# Patient Record
Sex: FEMALE | Race: WHITE | URBAN - METROPOLITAN AREA
[De-identification: names, ages, dates, MRNs, and addresses within clinical notes are randomized per-mention and may not be internally consistent; named-entity substitution may affect disease eponyms.]

---

## 2017-04-13 ENCOUNTER — TRANSFERRED RECORDS (OUTPATIENT)
Dept: HEALTH INFORMATION MANAGEMENT | Facility: CLINIC | Age: 67
End: 2017-04-13

## 2017-05-09 ENCOUNTER — TRANSFERRED RECORDS (OUTPATIENT)
Dept: HEALTH INFORMATION MANAGEMENT | Facility: CLINIC | Age: 67
End: 2017-05-09

## 2017-05-11 ENCOUNTER — MEDICAL CORRESPONDENCE (OUTPATIENT)
Dept: HEALTH INFORMATION MANAGEMENT | Facility: CLINIC | Age: 67
End: 2017-05-11

## 2017-05-11 ENCOUNTER — TRANSFERRED RECORDS (OUTPATIENT)
Dept: HEALTH INFORMATION MANAGEMENT | Facility: CLINIC | Age: 67
End: 2017-05-11

## 2017-06-19 NOTE — PROGRESS NOTES
Consult Notes on Referred Patient        Dr. Nicanor Palmer MD  [facility]  [address] [place] [address]       RE: Jane Amaya  : 1950  SHARDA: 2017    Dear Dr. Nicanor Palmer:    I had the pleasure of seeing your patient Jane Amaya here at the Gynecologic Cancer Clinic at the [facility] on 2017.  As you know she is a very pleasant 67 year old woman with a recent diagnosis of pelvic mass.  Given these findings she was subsequently sent to the Gynecologic Cancer Clinic for new patient consultation.  She is accompanied today by her daughter and infant grandson.    The patient has a history of lymphoma and was recently in to see her PCP for work up of a UTI and she ordered a CT for follow up for her lymphoma as she has not had follow up in quite some time.  She is relatively asymptomatic from this mass that was discovered    05:  Excision of intra-abdominal mass   Pathology:  Follicular lymphoma    17:  CT C/A/P:  Pulmonary nodule in RUL has increased in size now measuring 1.3 x 1.1 cm.  Stable subpleural nodule in the LLL.  Enlarging complex right ovarian cystic mass with mural nodularity and probable septation measuring 7.9 x 5.1 x 6.8 cm, previously 5.3 x 3.4 x 3.6 cm.  Partially calcified soft tissue lesion in left abdominal mesentery measuring 3.1 x 1.8 cm is stable along with other small lymphadenopathy which is consistent with treatment effect.      Review of Systems:  Systemic           no weight changes; no fever; no chills; no night sweats; no appetite changes; + fatigue  Skin           no rashes, or lesions  Eye           no irritation; no changes in vision  Rodrigue-Laryngeal           no dysphagia; no hoarseness   Pulmonary    no cough; + shortness of breath  Cardiovascular    no chest pain; no palpitations  Gastrointestinal    + diarrhea; + heartburn; no constipation; no abdominal pain; no changes in bowel  habits; no blood in  stool  Genitourinary   no urinary frequency; no urinary urgency; no dysuria; no pain; no abnormal vaginal discharge; no abnormal vaginal bleeding  Breast    no breast discharge; no breast changes; no breast pain  Musculoskeletal    no myalgias; no arthralgias; + back pain; + joint pain; + stiffness; + muscle weakness/cramps  Psychiatric           + depressed mood; no anxiety    Hematologic            no tender lymph nodes; no noticeable swellings or lumps   Endocrine    + hot flashes; no heat/cold intolerance         Neurological   no tremor; + numbness and tingling in hands; no headaches; no difficulty sleeping    Obstetrics and Gynecology History:  ,   Menopause in 40's, denies PMB      Past Medical History:  Past Medical History:   Diagnosis Date     Follicular lymphoma (H)      GERD (gastroesophageal reflux disease)        Past Surgical History:  Past Surgical History:   Procedure Laterality Date     APPENDECTOMY         Health Maintenance:  Last Pap Smear:               Result: normal  She has not had a history of abnormal Pap smears.    Last Mammogram: 6 years              Result: normal      She has not had a history of abnormal mammograms.    Last Colonoscopy: 11 years ago              Result: normal      Current Medications:   has a current medication list which includes the following prescription(s): esomeprazole magnesium.     Allergies:   Ciprofloxacin and fentanyl      Social History:  Social History     Social History     Marital status:      Spouse name: N/A     Number of children: N/A     Years of education: N/A     Occupational History     Not on file.     Social History Main Topics     Smoking status: Former Smoker     Quit date:      Smokeless tobacco: Not on file     Alcohol use No     Drug use: No     Sexual activity: Not on file     Other Topics Concern     Not on file     Social History Narrative     No narrative on file     Lives with  and roommate, feels safe  "at home.  Retired.  Enjoys reading, gardening, crocheting, spending time with family.  Does not have an advanced directive on file and would like her daughter, Geri to be her POA.  Would like full resuscitation if reversible cause is identified, however would not like to be kept on life sustaining measures long-term.      Family History:   The patient's family history is significant for.  Family History   Problem Relation Age of Onset     Breast Cancer Mother          Physical Exam:   /76  Pulse 64  Temp 97.3  F (36.3  C) (Oral)  Resp 20  Ht 1.727 m (5' 8\")  Wt 92.5 kg (204 lb)  SpO2 96%  BMI 31.02 kg/m2  Body mass index is 31.02 kg/(m^2).    General Appearance: healthy and alert, no distress     HEENT:  no thyromegaly, no palpable nodules or masses        Cardiovascular: regular rate and rhythm, no gallops, rubs or murmurs     Respiratory: lungs clear, no rales, rhonchi or wheezes, normal diaphragmatic excursion    Musculoskeletal: extremities non tender and without edema    Skin: no lesions or rashes     Neurological: normal gait, no gross defects     Psychiatric: appropriate mood and affect                               Hematological: normal cervical, supraclavicular and inguinal lymph nodes     Gastrointestinal:       abdomen soft, non-tender, non-distended, no organomegaly or masses    Genitourinary: External genitalia and urethral meatus appears normal.  Vagina is smooth without nodularity or masses.  Cervix appears normal and without lesions.  Bimanual exam reveal no masses, nodularity or fullness.  Recto-vaginal exam confirms these findings.      Assessment:    Jane Amaya is a 67 year old woman with a new diagnosis of pelvic mass.     A total of 60 minutes was spent with the patient, >50% of which were spent in counseling the patient and/or treatment planning.      Plan:     1.)    Pelvic mass.  Etiologies were discussed including benign, borderline and malignant, both a recurrence of her " lymphoma and a new primary ovarian malignancy.  We discussed the rationale for not performing a biopsy of the ovary.  We discussed the role of  and that one would be drawn today.  We discussed that given its size and her history, my recommendation is for removal of the mass.  We discussed bilateral oophorectomy given her age.  We discussed the role of frozen section analysis and that further surgical decisions would be based on these findings.  Risks, benefits, indications and alternatives were discussed with the patient.  All her questions were answered and consents were signed for laparoscopic removal of both ovaries and fallopian tubes, possible removal of uterus, possible cancer surgery, possible open on 6/28.     2.) Genetic risk factors were assessed and the patient does not meet the qualifications for a referral unless malignancy is identified at surgery.      3.) Labs and/or tests ordered include:  CBC, CMP, T/S, , EKG.     4.) Health maintenance issues addressed today include pt is due for mammogram and colonoscopy which will be addressed following surgery.    5.) Pre-op teaching was completed today.        Thank you for allowing us to participate in the care of your patient.         Sincerely,    Ashley Knutson MD  Gynecologic Oncology  [facility]       NAN FOSTER

## 2017-06-20 RX ORDER — HEPARIN SODIUM 10000 [USP'U]/ML
5000 INJECTION, SOLUTION INTRAVENOUS; SUBCUTANEOUS
Status: CANCELLED | OUTPATIENT
Start: 2017-06-20

## 2017-06-20 RX ORDER — PHENAZOPYRIDINE HYDROCHLORIDE 100 MG/1
200 TABLET, FILM COATED ORAL ONCE
Status: CANCELLED | OUTPATIENT
Start: 2017-06-20 | End: 2017-06-20

## 2017-06-23 ENCOUNTER — CARE COORDINATION (OUTPATIENT)
Dept: ONCOLOGY | Facility: CLINIC | Age: 67
End: 2017-06-23

## 2017-06-23 ENCOUNTER — ONCOLOGY VISIT (OUTPATIENT)
Dept: ONCOLOGY | Facility: CLINIC | Age: 67
End: 2017-06-23
Payer: COMMERCIAL

## 2017-06-23 VITALS
SYSTOLIC BLOOD PRESSURE: 140 MMHG | WEIGHT: 204 LBS | HEART RATE: 64 BPM | RESPIRATION RATE: 20 BRPM | DIASTOLIC BLOOD PRESSURE: 76 MMHG | OXYGEN SATURATION: 96 % | TEMPERATURE: 97.3 F | BODY MASS INDEX: 30.92 KG/M2 | HEIGHT: 68 IN

## 2017-06-23 DIAGNOSIS — R19.00 PELVIC MASS: Primary | ICD-10-CM

## 2017-06-23 DIAGNOSIS — D39.11 NEOPLASM OF UNCERTAIN BEHAVIOR OF OVARY, RIGHT: ICD-10-CM

## 2017-06-23 DIAGNOSIS — R19.00 PELVIC MASS: ICD-10-CM

## 2017-06-23 LAB
ALBUMIN SERPL-MCNC: 4.1 G/DL (ref 3.4–5)
ALP SERPL-CCNC: 126 U/L (ref 40–150)
ALT SERPL W P-5'-P-CCNC: 31 U/L (ref 0–50)
ANION GAP SERPL CALCULATED.3IONS-SCNC: 5 MMOL/L (ref 3–14)
AST SERPL W P-5'-P-CCNC: 18 U/L (ref 0–45)
BASOPHILS # BLD AUTO: 0 10E9/L (ref 0–0.2)
BASOPHILS NFR BLD AUTO: 0.3 %
BILIRUB SERPL-MCNC: 0.4 MG/DL (ref 0.2–1.3)
BUN SERPL-MCNC: 13 MG/DL (ref 7–30)
CALCIUM SERPL-MCNC: 8.7 MG/DL (ref 8.5–10.1)
CHLORIDE SERPL-SCNC: 108 MMOL/L (ref 94–109)
CO2 SERPL-SCNC: 30 MMOL/L (ref 20–32)
CREAT SERPL-MCNC: 0.73 MG/DL (ref 0.52–1.04)
DIFFERENTIAL METHOD BLD: NORMAL
EOSINOPHIL # BLD AUTO: 0.1 10E9/L (ref 0–0.7)
EOSINOPHIL NFR BLD AUTO: 1.3 %
ERYTHROCYTE [DISTWIDTH] IN BLOOD BY AUTOMATED COUNT: 12.9 % (ref 10–15)
GFR SERPL CREATININE-BSD FRML MDRD: 79 ML/MIN/1.7M2
GLUCOSE SERPL-MCNC: 101 MG/DL (ref 70–99)
HCT VFR BLD AUTO: 44.3 % (ref 35–47)
HGB BLD-MCNC: 15.2 G/DL (ref 11.7–15.7)
LYMPHOCYTES # BLD AUTO: 1.6 10E9/L (ref 0.8–5.3)
LYMPHOCYTES NFR BLD AUTO: 26.6 %
MCH RBC QN AUTO: 30.6 PG (ref 26.5–33)
MCHC RBC AUTO-ENTMCNC: 34.3 G/DL (ref 31.5–36.5)
MCV RBC AUTO: 89 FL (ref 78–100)
MONOCYTES # BLD AUTO: 0.5 10E9/L (ref 0–1.3)
MONOCYTES NFR BLD AUTO: 7.4 %
NEUTROPHILS # BLD AUTO: 3.9 10E9/L (ref 1.6–8.3)
NEUTROPHILS NFR BLD AUTO: 64.4 %
PLATELET # BLD AUTO: 229 10E9/L (ref 150–450)
POTASSIUM SERPL-SCNC: 4.3 MMOL/L (ref 3.4–5.3)
PROT SERPL-MCNC: 7.1 G/DL (ref 6.8–8.8)
RBC # BLD AUTO: 4.96 10E12/L (ref 3.8–5.2)
SODIUM SERPL-SCNC: 143 MMOL/L (ref 133–144)
WBC # BLD AUTO: 6.1 10E9/L (ref 4–11)

## 2017-06-23 PROCEDURE — 99205 OFFICE O/P NEW HI 60 MIN: CPT | Performed by: OBSTETRICS & GYNECOLOGY

## 2017-06-23 PROCEDURE — 86901 BLOOD TYPING SEROLOGIC RH(D): CPT | Performed by: OBSTETRICS & GYNECOLOGY

## 2017-06-23 PROCEDURE — 86850 RBC ANTIBODY SCREEN: CPT | Performed by: OBSTETRICS & GYNECOLOGY

## 2017-06-23 PROCEDURE — 85025 COMPLETE CBC W/AUTO DIFF WBC: CPT | Performed by: OBSTETRICS & GYNECOLOGY

## 2017-06-23 PROCEDURE — 80053 COMPREHEN METABOLIC PANEL: CPT | Performed by: OBSTETRICS & GYNECOLOGY

## 2017-06-23 PROCEDURE — 36415 COLL VENOUS BLD VENIPUNCTURE: CPT | Performed by: OBSTETRICS & GYNECOLOGY

## 2017-06-23 PROCEDURE — 86900 BLOOD TYPING SEROLOGIC ABO: CPT | Performed by: OBSTETRICS & GYNECOLOGY

## 2017-06-23 ASSESSMENT — PAIN SCALES - GENERAL: PAINLEVEL: NO PAIN (0)

## 2017-06-23 NOTE — PROGRESS NOTES
Patient Education Note - [facility]    Relevant Diagnosis:  Pelvic Mass    Teaching Topic:  Laparoscopic removal of both ovaries and fallopian tubes, possible removal of uterus, possible cancer surgery, possible open    Person(s) involved in teaching:  Patient and Daughter    Motivation Level:    Asks Questions:   Yes  Eager to Learn:  Yes  Cooperative:  Yes  Receptive (willing/able to accept information):  Yes  Comments:  None    Patient demonstrates understanding of the following:  Reason for the appointment, diagnosis and treatment plan:  Yes  Knowledge of proper use of medications and conditions for which they are ordered (with special attention to potential side effects or drug interactions):  Yes  Which situations necessitate calling provider and whom to contact:  Yes    Teaching Concerns:  No    Education/Instructional Materials Used/Given:     Before Your Surgery Booklet (NanoLumens)  Showering Before Surgery, bottle of CHG prep provided  Adult Pain Assessment Tool  Lymphedema: A Patient Resource Guide  Hysterectomy Guidelines   Home Care after Major Abdominal or Vaginal Surgery  [facility]  Accommodations Brochure   Phone numbers for [facility] and [address] Given to Patient    EKG: Completed in clinic    Lab: Completed in clinic    Chest xray: Not ordered    Post-op exam: 7/7/17 at 12:30 pm    Pre-op exam: N/A    Time spent teaching with patient:  25 minutes    Surgical consent forms, along with copy of EKG, faxed to [facility] at fax number [phone].  [facility]gery scheduling staff at [facility] also notified.      Joby Washburn, RN, BSN, OCN  RN Care Coordinator  [facility]

## 2017-06-23 NOTE — MR AVS SNAPSHOT
After Visit Summary   6/23/2017    Jane Amaya    MRN: [id number]           Patient Information     Date Of Birth          1950        Visit Information        Provider Department      6/23/2017 12:30 PM Ashley Emerson MD [facility] [[email or web address]]        Today's Diagnoses     Pelvic mass    -  1    Neoplasm of uncertain behavior of ovary, right           Care Instructions    You have been scheduled for surgery on: 6/28/17    Diagnosis:  Pelvic mass    The surgical procedure is: Laparoscopic removal of both ovaries and fallopian tubes, possible removal of uterus, possible cancer surgery, possible open    Anticipated length of surgery: 1-3 hrs.  You will be in the recovery room for approximately 2-3 hrs after surgery.  Your family will not be able to see you until after you leave the recovery room.    Length of hospital stay:  This is an outpatient, extended stay surgery.  You will be discharged same day if you meet criteria for discharge.  If you have a larger surgical incision, you will need to be in the hospital 2-3 days.  ______________________________________________________________________    Preparation for Surgery:    To Schedule   1.  Labs:  CBC, CMP, T/S, , orders placed, please perform today   2.  EKG:  Order placed, please perform today   3.  Post-operative exam with me 1-2 weeks following surgery      Postoperative Restrictions:  No heavy lifting >20lbs for eight weeks, nothing in the vagina (no tampons, no intercourse, no douching) for eight weeks.     Postoperative visit:  Return to clinic 1-2 weeks after surgery for post operative visit.      Please contact the clinic with any questions or concerns.    Ashley Knutson MD  Gynecologic Oncology  [facility]               Follow-ups after your visit        Your next 10 appointments already scheduled     Jun 28, 2017   Procedure with Ashley Hernandez MD   [facility] [place], Same Day  Surgery (--)    [address]  [place] [address]   [phone]            Jul 07, 2017 12:30 PM CDT   [facility]   [facility]    [address] [place] [address]   [phone]              Future tests that were ordered for you today     Open Future Orders        Priority Expected Expires Ordered    CBC with platelets differential Routine  6/23/2018 6/23/2017    CMP - Comprehensive Metabolic Panel Routine  6/23/2018 6/23/2017     Routine  6/23/2018 6/23/2017    EKG 12-lead complete with read (future) Routine  6/23/2018 6/23/2017            Who to contact     If you have questions or need follow up information about today's clinic visit or your schedule please contact [facility] directly at [phone].  Normal or non-critical lab and imaging results will be communicated to you by MyChart, letter or phone within 4 business days after the clinic has received the results. If you do not hear from us within 7 days, please contact the clinic through [employer] or phone. If you have a critical or abnormal lab result, we will notify you by phone as soon as possible.  Submit refill requests through Snapflow or call your pharmacy and they will forward the refill request to us. Please allow 3 business days for your refill to be completed.          Additional Information About Your Visit        Snapflow Information     Snapflow is an electronic gateway that provides easy, online access to your medical records. With Snapflow, you can request a clinic appointment, read your test results, renew a prescription or communicate with your care team.     To sign up for Snapflow visit the website at [email or web address]   You will be asked to enter the access code listed below, as well as some personal information. Please follow the directions to create your username and password.     Your access code is:  [email or web address]  Expires: 2017  5:01 PM     Your access code will  in 90 days. If you need help or a new code, please contact your [facility] or call [phone] for assistance.        Care EveryWhere ID     This is your Care EveryWhere ID. This could be used by other organizations to access your [facility] medical records  [id number]        Your Vitals Were     Pulse Temperature Respirations Height Pulse Oximetry BMI (Body Mass Index)    64 97.3  F (36.3  C) (Oral) 20 1.727 m (5' 8\") 96% 31.02 kg/m2       Blood Pressure from Last 3 Encounters:   17 140/76    Weight from Last 3 Encounters:   17 92.5 kg (204 lb)              We Performed the Following     ABO/Rh type and screen     Ambika-Operative Worksheet (Blank)        Primary Care Provider Office Phone # Fax #    Nicanor Palmer [phone]       [address] [address]        Equal Access to Services     [facility]: Hadii aad ku hadasho Soomaali, waaxda luqadaha, qaybta kaalmada adeegyada, sherice manzanares hayclarke barajas . So [facility] [phone].    ATENCIÓN: Si habla español, tiene a khanna disposición servicios gratuitos de asistencia lingüística. Gaby al [phone].    We comply with applicable federal civil rights laws and [place] laws. We do not discriminate on the basis of race, color, national origin, age, disability sex, sexual orientation or gender identity.            Thank you!     Thank you for choosing [facility]  for your care. Our goal is always to provide you with excellent care. Hearing back from our patients is one way we can continue to improve our services. Please take a few minutes to complete the written survey that you may receive in the mail after your visit with us. Thank you!             Your Updated Medication List - Protect others around you: Learn how to safely use, store and throw away your medicines at " www.disposemymeds.org.          This list is accurate as of: 6/23/17  5:01 PM.  Always use your most recent med list.                   Brand Name Dispense Instructions for use Diagnosis    NEXIUM PO

## 2017-06-26 DIAGNOSIS — R19.00 PELVIC MASS: ICD-10-CM

## 2017-06-26 LAB
ABO + RH BLD: NORMAL
ABO + RH BLD: NORMAL
BLD GP AB SCN SERPL QL: NORMAL
BLOOD BANK CMNT PATIENT-IMP: NORMAL
SPECIMEN EXP DATE BLD: NORMAL

## 2017-06-27 ENCOUNTER — ANESTHESIA EVENT (OUTPATIENT)
Dept: SURGERY | Facility: CLINIC | Age: 67
End: 2017-06-27
Payer: MEDICARE

## 2017-06-28 ENCOUNTER — HOSPITAL ENCOUNTER (OUTPATIENT)
Facility: CLINIC | Age: 67
Discharge: HOME | End: 2017-06-28
Attending: OBSTETRICS & GYNECOLOGY | Admitting: OBSTETRICS & GYNECOLOGY
Payer: MEDICARE

## 2017-06-28 ENCOUNTER — SURGERY (OUTPATIENT)
Age: 67
End: 2017-06-28
Payer: COMMERCIAL

## 2017-06-28 ENCOUNTER — ANESTHESIA (OUTPATIENT)
Dept: SURGERY | Facility: CLINIC | Age: 67
End: 2017-06-28
Payer: MEDICARE

## 2017-06-28 VITALS
SYSTOLIC BLOOD PRESSURE: 130 MMHG | WEIGHT: 205.47 LBS | HEART RATE: 67 BPM | HEIGHT: 68 IN | DIASTOLIC BLOOD PRESSURE: 76 MMHG | TEMPERATURE: 97.6 F | RESPIRATION RATE: 16 BRPM | BODY MASS INDEX: 31.14 KG/M2 | OXYGEN SATURATION: 95 %

## 2017-06-28 DIAGNOSIS — Z90.721 S/P HYSTERECTOMY WITH OOPHORECTOMY: Primary | ICD-10-CM

## 2017-06-28 DIAGNOSIS — Z90.710 S/P HYSTERECTOMY WITH OOPHORECTOMY: Primary | ICD-10-CM

## 2017-06-28 LAB
ABO + RH BLD: NORMAL
ABO + RH BLD: NORMAL
BLD GP AB SCN SERPL QL: NORMAL
BLOOD BANK CMNT PATIENT-IMP: NORMAL
CANCER AG125 SERPL-ACNC: 18 U/ML (ref 0–30)
INTERPRETATION ECG - MUSE: NORMAL
SPECIMEN EXP DATE BLD: NORMAL

## 2017-06-28 PROCEDURE — 25000132 ZZH RX MED GY IP 250 OP 250 PS 637: Mod: GY | Performed by: OBSTETRICS & GYNECOLOGY

## 2017-06-28 PROCEDURE — 88331 PATH CONSLTJ SURG 1 BLK 1SPC: CPT | Performed by: OBSTETRICS & GYNECOLOGY

## 2017-06-28 PROCEDURE — 37000008 ZZH ANESTHESIA TECHNICAL FEE, 1ST 30 MIN: Performed by: OBSTETRICS & GYNECOLOGY

## 2017-06-28 PROCEDURE — A9270 NON-COVERED ITEM OR SERVICE: HCPCS | Mod: GY | Performed by: OBSTETRICS & GYNECOLOGY

## 2017-06-28 PROCEDURE — 36415 COLL VENOUS BLD VENIPUNCTURE: CPT | Performed by: ANESTHESIOLOGY

## 2017-06-28 PROCEDURE — 71000015 ZZH RECOVERY PHASE 1 LEVEL 2 EA ADDTL HR: Performed by: OBSTETRICS & GYNECOLOGY

## 2017-06-28 PROCEDURE — 25000128 H RX IP 250 OP 636: Performed by: NURSE ANESTHETIST, CERTIFIED REGISTERED

## 2017-06-28 PROCEDURE — 86850 RBC ANTIBODY SCREEN: CPT | Performed by: ANESTHESIOLOGY

## 2017-06-28 PROCEDURE — 58571 TLH W/T/O 250 G OR LESS: CPT | Mod: GC | Performed by: OBSTETRICS & GYNECOLOGY

## 2017-06-28 PROCEDURE — 86901 BLOOD TYPING SEROLOGIC RH(D): CPT | Performed by: ANESTHESIOLOGY

## 2017-06-28 PROCEDURE — 88305 TISSUE EXAM BY PATHOLOGIST: CPT | Performed by: OBSTETRICS & GYNECOLOGY

## 2017-06-28 PROCEDURE — 49329 UNLSTD LAPS PX ABD PERTM&OMN: CPT | Mod: GC | Performed by: OBSTETRICS & GYNECOLOGY

## 2017-06-28 PROCEDURE — 25000128 H RX IP 250 OP 636: Performed by: ANESTHESIOLOGY

## 2017-06-28 PROCEDURE — 27210794 ZZH OR GENERAL SUPPLY STERILE: Performed by: OBSTETRICS & GYNECOLOGY

## 2017-06-28 PROCEDURE — 36000064 ZZH SURGERY LEVEL 4 EA 15 ADDTL MIN - UMMC: Performed by: OBSTETRICS & GYNECOLOGY

## 2017-06-28 PROCEDURE — 88112 CYTOPATH CELL ENHANCE TECH: CPT | Performed by: OBSTETRICS & GYNECOLOGY

## 2017-06-28 PROCEDURE — 71000027 ZZH RECOVERY PHASE 2 EACH 15 MINS: Performed by: OBSTETRICS & GYNECOLOGY

## 2017-06-28 PROCEDURE — 93010 ELECTROCARDIOGRAM REPORT: CPT | Performed by: INTERNAL MEDICINE

## 2017-06-28 PROCEDURE — 00000155 ZZHCL STATISTIC H-CELL BLOCK W/STAIN: Performed by: OBSTETRICS & GYNECOLOGY

## 2017-06-28 PROCEDURE — 25000125 ZZHC RX 250: Performed by: ANESTHESIOLOGY

## 2017-06-28 PROCEDURE — 88309 TISSUE EXAM BY PATHOLOGIST: CPT | Performed by: OBSTETRICS & GYNECOLOGY

## 2017-06-28 PROCEDURE — 36000062 ZZH SURGERY LEVEL 4 1ST 30 MIN - UMMC: Performed by: OBSTETRICS & GYNECOLOGY

## 2017-06-28 PROCEDURE — 25000125 ZZHC RX 250: Performed by: NURSE ANESTHETIST, CERTIFIED REGISTERED

## 2017-06-28 PROCEDURE — 88307 TISSUE EXAM BY PATHOLOGIST: CPT | Performed by: OBSTETRICS & GYNECOLOGY

## 2017-06-28 PROCEDURE — 25000128 H RX IP 250 OP 636: Performed by: OBSTETRICS & GYNECOLOGY

## 2017-06-28 PROCEDURE — 37000009 ZZH ANESTHESIA TECHNICAL FEE, EACH ADDTL 15 MIN: Performed by: OBSTETRICS & GYNECOLOGY

## 2017-06-28 PROCEDURE — 40000065 ZZH STATISTIC EKG NON-CHARGEABLE

## 2017-06-28 PROCEDURE — 71000014 ZZH RECOVERY PHASE 1 LEVEL 2 FIRST HR: Performed by: OBSTETRICS & GYNECOLOGY

## 2017-06-28 PROCEDURE — 86304 IMMUNOASSAY TUMOR CA 125: CPT | Performed by: OBSTETRICS & GYNECOLOGY

## 2017-06-28 PROCEDURE — 86900 BLOOD TYPING SEROLOGIC ABO: CPT | Performed by: ANESTHESIOLOGY

## 2017-06-28 PROCEDURE — C9399 UNCLASSIFIED DRUGS OR BIOLOG: HCPCS | Performed by: NURSE ANESTHETIST, CERTIFIED REGISTERED

## 2017-06-28 PROCEDURE — 40000170 ZZH STATISTIC PRE-PROCEDURE ASSESSMENT II: Performed by: OBSTETRICS & GYNECOLOGY

## 2017-06-28 RX ORDER — OXYCODONE AND ACETAMINOPHEN 5; 325 MG/1; MG/1
1-2 TABLET ORAL
Status: COMPLETED | OUTPATIENT
Start: 2017-06-28 | End: 2017-06-28

## 2017-06-28 RX ORDER — FENTANYL CITRATE 50 UG/ML
INJECTION, SOLUTION INTRAMUSCULAR; INTRAVENOUS PRN
Status: DISCONTINUED | OUTPATIENT
Start: 2017-06-28 | End: 2017-06-28

## 2017-06-28 RX ORDER — DEXAMETHASONE SODIUM PHOSPHATE 4 MG/ML
INJECTION, SOLUTION INTRA-ARTICULAR; INTRALESIONAL; INTRAMUSCULAR; INTRAVENOUS; SOFT TISSUE PRN
Status: DISCONTINUED | OUTPATIENT
Start: 2017-06-28 | End: 2017-06-28

## 2017-06-28 RX ORDER — CEFAZOLIN SODIUM 2 G/100ML
2 INJECTION, SOLUTION INTRAVENOUS
Status: COMPLETED | OUTPATIENT
Start: 2017-06-28 | End: 2017-06-28

## 2017-06-28 RX ORDER — CEFAZOLIN SODIUM 1 G/3ML
1 INJECTION, POWDER, FOR SOLUTION INTRAMUSCULAR; INTRAVENOUS SEE ADMIN INSTRUCTIONS
Status: DISCONTINUED | OUTPATIENT
Start: 2017-06-28 | End: 2017-06-28 | Stop reason: HOSPADM

## 2017-06-28 RX ORDER — HYDRALAZINE HYDROCHLORIDE 20 MG/ML
INJECTION INTRAMUSCULAR; INTRAVENOUS PRN
Status: DISCONTINUED | OUTPATIENT
Start: 2017-06-28 | End: 2017-06-28

## 2017-06-28 RX ORDER — ONDANSETRON 4 MG/1
4 TABLET, ORALLY DISINTEGRATING ORAL EVERY 30 MIN PRN
Status: DISCONTINUED | OUTPATIENT
Start: 2017-06-28 | End: 2017-06-28 | Stop reason: HOSPADM

## 2017-06-28 RX ORDER — ONDANSETRON 4 MG/1
4 TABLET, FILM COATED ORAL EVERY 8 HOURS PRN
Qty: 20 TABLET | Refills: 0 | Status: SHIPPED | OUTPATIENT
Start: 2017-06-28

## 2017-06-28 RX ORDER — IBUPROFEN 600 MG/1
600 TABLET, FILM COATED ORAL EVERY 6 HOURS PRN
Qty: 40 TABLET | Refills: 0 | Status: SHIPPED | OUTPATIENT
Start: 2017-06-28

## 2017-06-28 RX ORDER — ONDANSETRON 2 MG/ML
INJECTION INTRAMUSCULAR; INTRAVENOUS PRN
Status: DISCONTINUED | OUTPATIENT
Start: 2017-06-28 | End: 2017-06-28

## 2017-06-28 RX ORDER — PROPOFOL 10 MG/ML
INJECTION, EMULSION INTRAVENOUS PRN
Status: DISCONTINUED | OUTPATIENT
Start: 2017-06-28 | End: 2017-06-28

## 2017-06-28 RX ORDER — ONDANSETRON 2 MG/ML
4 INJECTION INTRAMUSCULAR; INTRAVENOUS EVERY 30 MIN PRN
Status: DISCONTINUED | OUTPATIENT
Start: 2017-06-28 | End: 2017-06-28 | Stop reason: HOSPADM

## 2017-06-28 RX ORDER — SCOLOPAMINE TRANSDERMAL SYSTEM 1 MG/1
1 PATCH, EXTENDED RELEASE TRANSDERMAL ONCE
Status: COMPLETED | OUTPATIENT
Start: 2017-06-28 | End: 2017-06-28

## 2017-06-28 RX ORDER — SODIUM CHLORIDE, SODIUM LACTATE, POTASSIUM CHLORIDE, CALCIUM CHLORIDE 600; 310; 30; 20 MG/100ML; MG/100ML; MG/100ML; MG/100ML
INJECTION, SOLUTION INTRAVENOUS CONTINUOUS
Status: DISCONTINUED | OUTPATIENT
Start: 2017-06-28 | End: 2017-06-28 | Stop reason: HOSPADM

## 2017-06-28 RX ORDER — PROPOFOL 10 MG/ML
INJECTION, EMULSION INTRAVENOUS CONTINUOUS PRN
Status: DISCONTINUED | OUTPATIENT
Start: 2017-06-28 | End: 2017-06-28

## 2017-06-28 RX ORDER — ONDANSETRON 4 MG/1
4 TABLET, ORALLY DISINTEGRATING ORAL
Status: DISCONTINUED | OUTPATIENT
Start: 2017-06-28 | End: 2017-06-28 | Stop reason: HOSPADM

## 2017-06-28 RX ORDER — LABETALOL HYDROCHLORIDE 5 MG/ML
10 INJECTION, SOLUTION INTRAVENOUS
Status: DISCONTINUED | OUTPATIENT
Start: 2017-06-28 | End: 2017-06-28 | Stop reason: HOSPADM

## 2017-06-28 RX ORDER — HYDROMORPHONE HYDROCHLORIDE 1 MG/ML
.3-.5 INJECTION, SOLUTION INTRAMUSCULAR; INTRAVENOUS; SUBCUTANEOUS EVERY 5 MIN PRN
Status: DISCONTINUED | OUTPATIENT
Start: 2017-06-28 | End: 2017-06-28 | Stop reason: HOSPADM

## 2017-06-28 RX ORDER — AMOXICILLIN 250 MG
1-2 CAPSULE ORAL 2 TIMES DAILY
Qty: 30 TABLET | Refills: 0 | Status: SHIPPED | OUTPATIENT
Start: 2017-06-28

## 2017-06-28 RX ORDER — FENTANYL CITRATE 50 UG/ML
25-50 INJECTION, SOLUTION INTRAMUSCULAR; INTRAVENOUS
Status: DISCONTINUED | OUTPATIENT
Start: 2017-06-28 | End: 2017-06-28 | Stop reason: HOSPADM

## 2017-06-28 RX ORDER — HEPARIN SODIUM 5000 [USP'U]/.5ML
5000 INJECTION, SOLUTION INTRAVENOUS; SUBCUTANEOUS
Status: COMPLETED | OUTPATIENT
Start: 2017-06-28 | End: 2017-06-28

## 2017-06-28 RX ORDER — OXYCODONE AND ACETAMINOPHEN 5; 325 MG/1; MG/1
1 TABLET ORAL EVERY 4 HOURS PRN
Qty: 30 TABLET | Refills: 0 | Status: SHIPPED | OUTPATIENT
Start: 2017-06-28 | End: 2017-07-07

## 2017-06-28 RX ORDER — PHENAZOPYRIDINE HYDROCHLORIDE 200 MG/1
200 TABLET, FILM COATED ORAL ONCE
Status: COMPLETED | OUTPATIENT
Start: 2017-06-28 | End: 2017-06-28

## 2017-06-28 RX ORDER — IBUPROFEN 200 MG
600 TABLET ORAL
Status: COMPLETED | OUTPATIENT
Start: 2017-06-28 | End: 2017-06-28

## 2017-06-28 RX ADMIN — MIDAZOLAM HYDROCHLORIDE 2 MG: 1 INJECTION, SOLUTION INTRAMUSCULAR; INTRAVENOUS at 07:32

## 2017-06-28 RX ADMIN — FENTANYL CITRATE 50 MCG: 50 INJECTION, SOLUTION INTRAMUSCULAR; INTRAVENOUS at 09:07

## 2017-06-28 RX ADMIN — HYDRALAZINE HYDROCHLORIDE 5 MG: 20 INJECTION INTRAMUSCULAR; INTRAVENOUS at 09:57

## 2017-06-28 RX ADMIN — PROCHLORPERAZINE EDISYLATE 2.5 MG: 5 INJECTION INTRAMUSCULAR; INTRAVENOUS at 14:04

## 2017-06-28 RX ADMIN — PROPOFOL 140 MG: 10 INJECTION, EMULSION INTRAVENOUS at 07:40

## 2017-06-28 RX ADMIN — FENTANYL CITRATE 50 MCG: 50 INJECTION, SOLUTION INTRAMUSCULAR; INTRAVENOUS at 08:13

## 2017-06-28 RX ADMIN — DEXAMETHASONE SODIUM PHOSPHATE 6 MG: 4 INJECTION, SOLUTION INTRA-ARTICULAR; INTRALESIONAL; INTRAMUSCULAR; INTRAVENOUS; SOFT TISSUE at 08:21

## 2017-06-28 RX ADMIN — ONDANSETRON 4 MG: 2 INJECTION INTRAMUSCULAR; INTRAVENOUS at 11:07

## 2017-06-28 RX ADMIN — SUGAMMADEX 200 MG: 100 INJECTION, SOLUTION INTRAVENOUS at 09:45

## 2017-06-28 RX ADMIN — ONDANSETRON 4 MG: 2 INJECTION INTRAMUSCULAR; INTRAVENOUS at 08:45

## 2017-06-28 RX ADMIN — OXYCODONE HYDROCHLORIDE AND ACETAMINOPHEN 1 TABLET: 5; 325 TABLET ORAL at 13:58

## 2017-06-28 RX ADMIN — FENTANYL CITRATE 50 MCG: 50 INJECTION, SOLUTION INTRAMUSCULAR; INTRAVENOUS at 07:58

## 2017-06-28 RX ADMIN — FENTANYL CITRATE 50 MCG: 50 INJECTION, SOLUTION INTRAMUSCULAR; INTRAVENOUS at 07:40

## 2017-06-28 RX ADMIN — PHENAZOPYRIDINE HYDROCHLORIDE 200 MG: 200 TABLET, FILM COATED ORAL at 06:55

## 2017-06-28 RX ADMIN — HYDROMORPHONE HYDROCHLORIDE 0.4 MG: 1 INJECTION, SOLUTION INTRAMUSCULAR; INTRAVENOUS; SUBCUTANEOUS at 10:43

## 2017-06-28 RX ADMIN — IBUPROFEN 600 MG: 600 TABLET ORAL at 15:30

## 2017-06-28 RX ADMIN — HYDRALAZINE HYDROCHLORIDE 5 MG: 20 INJECTION INTRAMUSCULAR; INTRAVENOUS at 08:18

## 2017-06-28 RX ADMIN — ROCURONIUM BROMIDE 50 MG: 10 INJECTION INTRAVENOUS at 07:40

## 2017-06-28 RX ADMIN — SODIUM CHLORIDE, POTASSIUM CHLORIDE, SODIUM LACTATE AND CALCIUM CHLORIDE: 600; 310; 30; 20 INJECTION, SOLUTION INTRAVENOUS at 08:55

## 2017-06-28 RX ADMIN — HYDRALAZINE HYDROCHLORIDE 5 MG: 20 INJECTION INTRAMUSCULAR; INTRAVENOUS at 10:08

## 2017-06-28 RX ADMIN — HYDRALAZINE HYDROCHLORIDE 5 MG: 20 INJECTION INTRAMUSCULAR; INTRAVENOUS at 08:27

## 2017-06-28 RX ADMIN — PROCHLORPERAZINE EDISYLATE 2.5 MG: 5 INJECTION INTRAMUSCULAR; INTRAVENOUS at 11:34

## 2017-06-28 RX ADMIN — FENTANYL CITRATE 25 MCG: 50 INJECTION, SOLUTION INTRAMUSCULAR; INTRAVENOUS at 10:26

## 2017-06-28 RX ADMIN — HYDROMORPHONE HYDROCHLORIDE 0.3 MG: 1 INJECTION, SOLUTION INTRAMUSCULAR; INTRAVENOUS; SUBCUTANEOUS at 10:38

## 2017-06-28 RX ADMIN — PROPOFOL 150 MCG/KG/MIN: 10 INJECTION, EMULSION INTRAVENOUS at 07:46

## 2017-06-28 RX ADMIN — SCOPALAMINE 1 PATCH: 1 PATCH, EXTENDED RELEASE TRANSDERMAL at 06:55

## 2017-06-28 RX ADMIN — CEFAZOLIN SODIUM 2 G: 2 INJECTION, SOLUTION INTRAVENOUS at 09:14

## 2017-06-28 RX ADMIN — ROCURONIUM BROMIDE 20 MG: 10 INJECTION INTRAVENOUS at 08:48

## 2017-06-28 RX ADMIN — HEPARIN SODIUM 5000 UNITS: 5000 INJECTION, SOLUTION INTRAVENOUS; SUBCUTANEOUS at 07:20

## 2017-06-28 RX ADMIN — SODIUM CHLORIDE, POTASSIUM CHLORIDE, SODIUM LACTATE AND CALCIUM CHLORIDE: 600; 310; 30; 20 INJECTION, SOLUTION INTRAVENOUS at 07:33

## 2017-06-28 ASSESSMENT — ENCOUNTER SYMPTOMS: SEIZURES: 0

## 2017-06-28 NOTE — IP AVS SNAPSHOT
MRN:[id number]                      After Visit Summary   6/28/2017    Jane Amaya    MRN: [id number]           Thank you!     Thank you for choosing [facility] for your care. Our goal is always to provide you with excellent care. Hearing back from our patients is one way we can continue to improve our services. Please take a few minutes to complete the written survey that you may receive in the mail after you visit with us. Thank you!        Patient Information     Date Of Birth          1950        About your hospital stay     You were admitted on:  June 28, 2017 You last received care in the:  Same Day Surgery [facility]    You were discharged on:  June 28, 2017       Who to Call     For medical emergencies, please call 911.  For non-urgent questions about your medical care, please call your primary care provider or clinic, [phone]  For questions related to your surgery, please call your surgery clinic        Attending Provider     Provider Specialty    Ashley Emerson MD Gyn-Onc       Primary Care Provider Office Phone # Fax #    Nicanor Palmer [phone]      After Care Instructions     Discharge Instructions       Check with Provider as to when patient to start anticoagulants    GENERAL POST-OPERATIVE    PATIENT INSTRUCTIONS       FOLLOW-UP:     Call Surgeon if you have:    Temperature greater than 100.4  Persistent nausea and vomiting  Severe uncontrolled pain  Redness, tenderness, or signs of infection (pain, swelling, redness, odor or green/yellow discharge around the site)  Difficulty breathing, headache or visual disturbances  Hives  Persistent dizziness or light-headedness  Extreme fatigue    Any other questions or concerns you may have after discharge    In an emergency, call 911 or go to an Emergency Department at a nearby hospital      WOUND CARE INSTRUCTIONS: Keep a dry clean dressing on the wound if there is drainage. The initial  bandage may be removed after 24 hours. Once the wound has quit draining you may leave it open to air. If clothing rubs against the wound or causes irritation and the wound is not draining you may cover it with a dry dressing during the daytime. Try to keep the wound dry and avoid ointments on the wound unless directed to do so. If the wound becomes bright red and painful or starts to drain infected material that is not clear, please contact your physician immediately.     1. You may shower 48 hrs after surgery    DIET: There are no dietary restrictions. You may eat any foods that you can tolerate. It is a good idea to eat a high fiber diet and take in plenty of fluids to prevent constipation. If you do become constipated you may want to take a mild laxative or take ducolax tablets on a daily basis until your bowel habits are regular. Constipation can be very uncomfortable, along with straining, after recent surgery.      ACTIVITY: You are encouraged to cough and deep breath or use your incentive spirometer if you were given one, every 15-30 minutes when awake. This will help prevent respiratory complications and low grade fevers post-operatively if you had a general anesthetic. You may want to hug a pillow when coughing and sneezing to add additional support to the surgical area, if you had abdominal or chest surgery, which will decrease pain during these times.       1. No heavy lifting >20lbs or strenuous exercise for eight weeks. No exercise in which you are using core muscles (yoga, pilates, swimming, weight lifting)    2. You may walk as much as you wish. You are encouraged to increase your activity each day after surgery. Stairs are okay.     3. Nothing per vagina for eight weeks. No tampons, no intercourse, no douching. You can expect some light vaginal spotting and discharge for up to six weeks. If bleeding becomes heavy, please contact the office.       MEDICATIONS: Try to take narcotic medications and  anti-inflammatory medications, such as tylenol, ibuprofen, naprosyn, etc., with food. This will minimize stomach upset from the medication. Should you develop nausea and vomiting from the pain medication, or develop a rash, please discontinue the medication and contact your physician. You should not drive, make important decisions, or operate machinery when taking narcotic pain medication.      OTHER: Patients are often constipated after general anesthesia and surgery. The patient should continue to take stool softeners (for example, Senokot-S) for the next six weeks and consume adequate amounts of water. If the patient remains constipated or unable to pass stool, please try one or all of the following measures:    1. Milk of Magnesia 30cc twice a day as needed by mouth    2. Metamucil 2 tablespoons in 12 ounces of fluid    3. Dulcolax oral or suppositories    4. Prunes or prune juice    5. Miralax daily      QUESTIONS: Please feel free to call your physician or the hospital  if you have any questions, and they will be glad to assist you.            Discharge Instructions       Resume pre procedure diet            Discharge Instructions       Pelvic Rest. No tampons, douching or intercourse for  9  weeks.            Discharge Instructions       No driving when on narcotic pain medications            Discharge Instructions       Please follow up as scheduled with Dr. Knutson on 7/7/17            Ice to affected area       PRN as tolerated            No driving or operating machinery       No driving or operating machinery until day after procedure            No lifting       No lifting over 20 pounds and no strenuous physical activity.  For 8 weeks            Shower        Shower on Post-op day  1                  Your next 10 appointments already scheduled     Jul 07, 2017 12:30 PM CDT   Post Op with Ashley Hernandez MD   [facility]    [address] [place] [address]  [place]  [place] [id number] [phone]              Further instructions from your care team       [facility] [place]  Same-Day Surgery   Adult Discharge Orders & Instructions     For 24 hours after surgery    1. Get plenty of rest.  A responsible adult must stay with you for at least 24 hours after you leave the hospital.   2. Do not drive or use heavy equipment.  If you have weakness or tingling, don't drive or use heavy equipment until this feeling goes away.  3. Do not drink alcohol.  4. Avoid strenuous or risky activities.  Ask for help when climbing stairs.   5. You may feel lightheaded.  IF so, sit for a few minutes before standing.  Have someone help you get up.   6. If you have nausea (feel sick to your stomach): Drink only clear liquids such as apple juice, ginger ale, broth or 7-Up.  Rest may also help.  Be sure to drink enough fluids.  Move to a regular diet as you feel able.  7. You may have a slight fever. Call the doctor if your fever is over 100 F (37.7 C) (taken under the tongue) or lasts longer than 24 hours.  8. You may have a dry mouth, a sore throat, muscle aches or trouble sleeping.  These should go away after 24 hours.  9. Do not make important or legal decisions.   Call your doctor for any of the followin.  Signs of infection (fever, growing tenderness at the surgery site, a large amount of drainage or bleeding, severe pain, foul-smelling drainage, redness, swelling).    2. It has been over 8 to 10 hours since surgery and you are still not able to urinate (pass water).    3.  Headache for over 24 hours.    To contact a doctor, call Dr Zenia Hernandez's office @ [phone] or:        [phone] and ask for the resident on call for GYN/ONC (answered 24 hours a day)      Emergency Department:    [facility]: [phone]       (TTY for hearing impaired: [phone])    Scopolamine Patch- (Absorbed through the skin)    Prevents nausea and vomiting  caused by motion sickness or anesthesia and surgery in adults.    Brand Name(s): Transderm Scop, Transderm-Scope  There may be other brand names for this medicine.    When This Medicine Should Not Be Used:  You should not use this medicine if you have had an allergic reaction to scopolomine, or if you have narrow angle glaucoma.    How to Use This Medicine:  Patch      Your doctor will tell you how many patches to use, where to apply them, and how often to apply them. Do not use more patches or apply them more often than your doctor tells you to.    This medicine comes with patient instructions. Read and follow these instructions carefully. Ask your doctor or pharmacist if you have any questions.    To prevent motion sickness, apply the patch at least 4 hours before you need it.    Wash and dry your hands thoroughly before applying the patch.    Leave the patch in its sealed wrapper until you are ready to put it on. Tear the wrapper open carefully. NEVER CUT the wrapper or the patch with scissors. Do not use any patch that has been cut by accident.    Take the liner off the sticky side before applying.    Apply the patch to dry, hairless skin behind the ear.    If the patch is loose or falls off,apply a new patch at a different place behind the ear.    After you take off the patch, wash the place where the patch was and your hands thoroughly.    Only one patch should be used at any time.    How to Dispose of This Medicine:      Fold the used patch in half with the sticky sides together. Throw any used patch away so that children or pets cannot get to it. You will also need to throw away old patches after the expiration date has passed.    Keep all medicine away from children and never share your medicine with anyone.    Drugs and Foods to Avoid:  Ask your doctor or pharmacist before using any other medicine, including over-the-counter medicines, vitamins, and herbal products.      Tell your doctor if you are using  any medicines that make you sleepy. These include sleeping pills, cold and allergy medicine, narcotic pain relievers, and sedatives.     Do not drink alcohol while you are using this medicine.    Warnings While Using This Medicine:      Make sure your doctor knows if you are pregnant or breastfeeding, or if you have glaucoma, prostate problems, trouble urinating, blocked bowels, liver disease, kidney disease, or a history of seizures or mental illness.    This medicine can cause blurring of vision and other vision problems if it comes in contact with the eyes. This medicine may also cause problems with urination. If any of these reactions occur, remove the patch and call your doctor right away.    This medicine may make you dizzy or drowsy. Avoid driving, using machines, or doing anything else that could be dangerous if you are not alert. If you plan to participate in underwater sports, this medicine may cause disorienting effects. If this is a concern for you, talk with your doctor.    This medicine may make you sweat less and cause your body to get too hot. Be careful in hot weather, when your are exercising, or if using sauna or whirlpool.    Make sure any doctor or dentist who treats you knows that you are using this medicine. This medicine may affect the results of certain medical tests.    Skin burns have been reported at the patch site in several patients wearing an aluminized transdermal system during a magnetic resonance imaging scan (MRI). Because Transderm Scop contains aluminum, it is recommended to remove the system before undergoing an MRI.    Possible Side Effects While Using This Medicine:  Call your doctor right away if you notice any of these side effects:      Allergic reaction: Itching or hives, swelling in your face or hands, swelling or tingling in your mouth or throat, chest tightness, trouble breathing.    Blurred vision,    Confusion or memory loss.    Fast,slow, or uneven  "heartbeat.    Lightheadedness, dizziness, drowsiness, or fainting.    Seeing, hearing, or feeling things that are not there.    Severe eye pain.    Trouble urinating.      If you notice these less serious side effects, talk with your doctor:      Dry mouth.    Dry, itchy, or red eyes.    Restlessness    Skin rash or redness.    If you notice other side effects that you think are caused by this medicine, tell your doctor immediately.    Please remove the patch behind your right ear within 24 hours of placement. Remove at 4pm on 17.    Pending Results     Date and Time Order Name Status Description    2017 Surgical pathology exam Preliminary     2017 Cytology non gyn In process             Admission Information     Date & Time Provider Department Dept. Phone    2017 Ashley Emerson MD Same Day Surgery [facility] [phone]      Your Vitals Were     Blood Pressure Pulse Temperature Respirations Height Weight    132/68 67 97.7  F (36.5  C) (Oral) 16 1.727 m (5' 7.99\") 93.2 kg (205 lb 7.5 oz)    Pulse Oximetry BMI (Body Mass Index)                97% 31.25 kg/m2          [employer] Information     [employer] lets you send messages to your doctor, view your test results, renew your prescriptions, schedule appointments and more. To sign up, go to [email or web address] . Click on \"Log in\" on the left side of the screen, which will take you to the Welcome page. Then click on \"Sign up Now\" on the right side of the page.     You will be asked to enter the access code listed below, as well as some personal information. Please follow the directions to create your username and password.     Your access code is: [email or web address]  Expires: 2017  5:01 PM     Your access code will  in 90 days. If you need help or a new code, please call your [facility] clinic or [phone].        Care EveryWhere ID     This is your Care EveryWhere ID. This could be used by other organizations to " access your [facility] medical records  [id number]        Equal Access to Services     DILEEP FITZGERALD : Hadii aad ku hadcoltenjohn Bajwa, wajuanada angie, qakathita gunnarglenroylinda davies, sherice nanceodalisrafael rose. So [facility] [phone].    ATENCIÓN: Si habla español, tiene a khanna disposición servicios gratuitos de asistencia lingüística. Llame al [phone].    We comply with applicable federal civil rights laws and [place] laws. We do not discriminate on the basis of race, color, national origin, age, disability sex, sexual orientation or gender identity.               Review of your medicines      START taking        Dose / Directions    ibuprofen 600 MG tablet   Commonly known as:  ADVIL/MOTRIN   Used for:  S/P hysterectomy with oophorectomy        Dose:  600 mg   Take 1 tablet (600 mg) by mouth every 6 hours as needed for pain (mild)   Quantity:  40 tablet   Refills:  0       ondansetron 4 MG tablet   Commonly known as:  ZOFRAN   Used for:  S/P hysterectomy with oophorectomy        Dose:  4 mg   Take 1 tablet (4 mg) by mouth every 8 hours as needed for nausea   Quantity:  20 tablet   Refills:  0       oxyCODONE-acetaminophen 5-325 MG per tablet   Commonly known as:  PERCOCET   Used for:  S/P hysterectomy with oophorectomy        Dose:  1 tablet   Take 1 tablet by mouth every 4 hours as needed for pain   Quantity:  30 tablet   Refills:  0       senna-docusate 8.6-50 MG per tablet   Commonly known as:  SENOKOT-S;PERICOLACE   Used for:  S/P hysterectomy with oophorectomy        Dose:  1-2 tablet   Take 1-2 tablets by mouth 2 times daily Take while on oral narcotics to prevent or treat constipation.   Quantity:  30 tablet   Refills:  0         CONTINUE these medicines which have NOT CHANGED        Dose / Directions    NEXIUM PO        Refills:  0            Where to get your medicines      These medications were sent to [facility] - [place], [place] - [address]  [address],  [facility] [address]     Phone:  [phone]     ibuprofen 600 MG tablet    ondansetron 4 MG tablet    senna-docusate 8.6-50 MG per tablet         Some of these will need a paper prescription and others can be bought over the counter. Ask your nurse if you have questions.     Bring a paper prescription for each of these medications     oxyCODONE-acetaminophen 5-325 MG per tablet                Protect others around you: Learn how to safely use, store and throw away your medicines at www.disposemymeds.org.             Medication List: This is a list of all your medications and when to take them. Check marks below indicate your daily home schedule. Keep this list as a reference.      Medications           Morning Afternoon Evening Bedtime As Needed    ibuprofen 600 MG tablet   Commonly known as:  ADVIL/MOTRIN   Take 1 tablet (600 mg) by mouth every 6 hours as needed for pain (mild)   Last time this was given:  600 mg on 6/28/2017  3:30 PM                                NEXIUM PO                                ondansetron 4 MG tablet   Commonly known as:  ZOFRAN   Take 1 tablet (4 mg) by mouth every 8 hours as needed for nausea                                oxyCODONE-acetaminophen 5-325 MG per tablet   Commonly known as:  PERCOCET   Take 1 tablet by mouth every 4 hours as needed for pain   Last time this was given:  1 tablet on 6/28/2017  1:58 PM                                senna-docusate 8.6-50 MG per tablet   Commonly known as:  SENOKOT-S;PERICOLACE   Take 1-2 tablets by mouth 2 times daily Take while on oral narcotics to prevent or treat constipation.

## 2017-06-28 NOTE — DISCHARGE INSTRUCTIONS
[facility]  Same-Day Surgery   Adult Discharge Orders & Instructions     For 24 hours after surgery    1. Get plenty of rest.  A responsible adult must stay with you for at least 24 hours after you leave the hospital.   2. Do not drive or use heavy equipment.  If you have weakness or tingling, don't drive or use heavy equipment until this feeling goes away.  3. Do not drink alcohol.  4. Avoid strenuous or risky activities.  Ask for help when climbing stairs.   5. You may feel lightheaded.  IF so, sit for a few minutes before standing.  Have someone help you get up.   6. If you have nausea (feel sick to your stomach): Drink only clear liquids such as apple juice, ginger ale, broth or 7-Up.  Rest may also help.  Be sure to drink enough fluids.  Move to a regular diet as you feel able.  7. You may have a slight fever. Call the doctor if your fever is over 100 F (37.7 C) (taken under the tongue) or lasts longer than 24 hours.  8. You may have a dry mouth, a sore throat, muscle aches or trouble sleeping.  These should go away after 24 hours.  9. Do not make important or legal decisions.   Call your doctor for any of the followin.  Signs of infection (fever, growing tenderness at the surgery site, a large amount of drainage or bleeding, severe pain, foul-smelling drainage, redness, swelling).    2. It has been over 8 to 10 hours since surgery and you are still not able to urinate (pass water).    3.  Headache for over 24 hours.    To contact a doctor, call Dr Zenia Hernandez's office @ [phone] or:        [phone] and ask for the resident on call for GYN/ONC (answered 24 hours a day)      Emergency Department:    [facility]: [phone]       (TTY for hearing impaired: [phone])    Scopolamine Patch- (Absorbed through the skin)    Prevents nausea and vomiting caused by motion sickness or anesthesia and surgery in adults.    Brand Name(s): Transderm Scop,  Transderm-Scope  There may be other brand names for this medicine.    When This Medicine Should Not Be Used:  You should not use this medicine if you have had an allergic reaction to scopolomine, or if you have narrow angle glaucoma.    How to Use This Medicine:  Patch      Your doctor will tell you how many patches to use, where to apply them, and how often to apply them. Do not use more patches or apply them more often than your doctor tells you to.    This medicine comes with patient instructions. Read and follow these instructions carefully. Ask your doctor or pharmacist if you have any questions.    To prevent motion sickness, apply the patch at least 4 hours before you need it.    Wash and dry your hands thoroughly before applying the patch.    Leave the patch in its sealed wrapper until you are ready to put it on. Tear the wrapper open carefully. NEVER CUT the wrapper or the patch with scissors. Do not use any patch that has been cut by accident.    Take the liner off the sticky side before applying.    Apply the patch to dry, hairless skin behind the ear.    If the patch is loose or falls off,apply a new patch at a different place behind the ear.    After you take off the patch, wash the place where the patch was and your hands thoroughly.    Only one patch should be used at any time.    How to Dispose of This Medicine:      Fold the used patch in half with the sticky sides together. Throw any used patch away so that children or pets cannot get to it. You will also need to throw away old patches after the expiration date has passed.    Keep all medicine away from children and never share your medicine with anyone.    Drugs and Foods to Avoid:  Ask your doctor or pharmacist before using any other medicine, including over-the-counter medicines, vitamins, and herbal products.      Tell your doctor if you are using any medicines that make you sleepy. These include sleeping pills, cold and allergy medicine,  narcotic pain relievers, and sedatives.     Do not drink alcohol while you are using this medicine.    Warnings While Using This Medicine:      Make sure your doctor knows if you are pregnant or breastfeeding, or if you have glaucoma, prostate problems, trouble urinating, blocked bowels, liver disease, kidney disease, or a history of seizures or mental illness.    This medicine can cause blurring of vision and other vision problems if it comes in contact with the eyes. This medicine may also cause problems with urination. If any of these reactions occur, remove the patch and call your doctor right away.    This medicine may make you dizzy or drowsy. Avoid driving, using machines, or doing anything else that could be dangerous if you are not alert. If you plan to participate in underwater sports, this medicine may cause disorienting effects. If this is a concern for you, talk with your doctor.    This medicine may make you sweat less and cause your body to get too hot. Be careful in hot weather, when your are exercising, or if using sauna or whirlpool.    Make sure any doctor or dentist who treats you knows that you are using this medicine. This medicine may affect the results of certain medical tests.    Skin burns have been reported at the patch site in several patients wearing an aluminized transdermal system during a magnetic resonance imaging scan (MRI). Because Transderm Scop contains aluminum, it is recommended to remove the system before undergoing an MRI.    Possible Side Effects While Using This Medicine:  Call your doctor right away if you notice any of these side effects:      Allergic reaction: Itching or hives, swelling in your face or hands, swelling or tingling in your mouth or throat, chest tightness, trouble breathing.    Blurred vision,    Confusion or memory loss.    Fast,slow, or uneven heartbeat.    Lightheadedness, dizziness, drowsiness, or fainting.    Seeing, hearing, or feeling things that  are not there.    Severe eye pain.    Trouble urinating.      If you notice these less serious side effects, talk with your doctor:      Dry mouth.    Dry, itchy, or red eyes.    Restlessness    Skin rash or redness.    If you notice other side effects that you think are caused by this medicine, tell your doctor immediately.    Please remove the patch behind your right ear within 24 hours of placement. Remove at 4pm on 6/29/17.

## 2017-06-28 NOTE — OR NURSING
Surgical team wrote additional Rx (zorfan) for patient to be d/c with. Waiting for pharmacy to fill.

## 2017-06-28 NOTE — ANESTHESIA CARE TRANSFER NOTE
Patient: Jane Amaya    Procedure(s):  Laparoscopic Total Hysterectomy, Removal Of Uterus, Cervix, Both Ovaries And Fallopian Tubes, Omentectomy, Cancer Staging Biopsies,Cystoscopy - Wound Class: II-Clean Contaminated   - Wound Class: II-Clean Contaminated    Diagnosis: Pelvic Mass   Diagnosis Additional Information: No value filed.    Anesthesia Type:   General, ETT     Note:  Airway :Face Mask  Patient transferred to:PACU  Comments: To PACU, VSS, pt awake and alert, exchanging well, report to RN, care accepted.      Vitals: (Last set prior to Anesthesia Care Transfer)    CRNA VITALS  6/28/2017 0933 - 6/28/2017 1006      6/28/2017             NIBP: (!)  181/117    Pulse: 65    NIBP Mean: 129    Ht Rate: 73    SpO2: 98 %    Resp Rate (observed): 21    Resp Rate (set): 10                Electronically Signed By: PHILL Herring CRNA  June 28, 2017  10:06 AM

## 2017-06-28 NOTE — OR NURSING
EKG completed stating a fib. Pt unaware of history but states EKG was completed in clinic on Friday 6/23. Unable to see EKG from clinic visit. Dr. Trujillo notified. Will continue to monitor.

## 2017-06-28 NOTE — OP NOTE
Gynecologic Oncology Operative Report    6/28/2017  Jane Amaya  [id number]    PREOPERATIVE DIAGNOSIS: Pelvic mass    POSTOPERATIVE DIAGNOSIS: At least borderline tumor on frozen section pathology, final pathology pending    PROCEDURES: Total laparoscopic hysterectomy, bilateral salpingo-oophorectomy, omentectomy, staging biopsies, cystoscopy    SURGEON: Ashley Knutson MD     ASSISTANTS:  Kita Whatley MD, PGY-4.     ANESTHESIA: General endotracheal     ESTIMATED BLOOD LOSS: 100 cc     IV FLUIDS: 1200 cc crystalloid    URINE OUTPUT: 260 cc clear urine     INDICATIONS: Jane Amaya is a 67 year old who has a history of lymphoma and was recently at a routine visit for her PCP who noted she had not had follow up for her lymphoma in quite some time and thus she ordered a CT that showed an enlarging complex right ovarian cystic mass with mural nodularity and probable septation measuring 7.9 x 5.1 x 6.8 cm, previously this was 5.3 x 3.4 x 3.6 cm as well as a lesion in the left mesentery measuring 3.1 x 1.8 cm that was stable and consistent with treatment effect.  A  was obtained and was normal at 18.  In the pre-operative area she requested that I not remove lymph nodes even in the case of a malignancy given that her mother had many issues with her lymphedema.  I discussed that this lack of information could change her post-operative treatment but she still wanted to decline a lymph node dissection.  Following a thorough discussion of the risks, benefits, indications and alternatives she consented to the above procedure.    FINDINGS: On EUA the patient had normal external genitalia and a normal sized uterus with a palpable right adnexal mass.  On laparoscopy there was a normal appearing uterus and left ovary but an enlarged, multi-cystic right ovary with several solid areas.  This was removed intact and then morcellated in the EndoCatch bag for ease of removal.  There were some mild adhesions from the  liver to the anterior abdominal wall as well as some adhesions from the round ligaments to the anterior abdominal walls.  The remainder of the abdominal and pelvic surveys was unremarkable.  Frozen section results showed at least a borderline tumor with some concern for invasive malignancy.  On cystoscopy there was no evidence of bladder injury or foreign body and there was brisk efflux from the bilateral ureteral orifices.    SPECIMENS:   1.  Pelvic washings  2.  Right and left ovary and fallopian tubes  3.  Uterus and cervix  4.  Omentum  5.  Staging biopsies including bilateral diaphragm washings    COMPLICATIONS: None.     CONDITION: Stable to PACU.     PROCEDURE:   Consent was reviewed with the patient in the preoperative setting and confirmed. She received prophylactic antibiotics. In addition, she received heparin for venous thrombosis prevention. The patient was transferred to the operating room and placed in dorsal supine position. General anesthetic was obtained in the usual manner without noted difficulties. The patient was then positioned onto Kev stirrups and an exam under anesthesia was performed with findings as described above.  The patient was prepped and draped for the above-mentioned procedure and Forde catheter was then placed under sterile techniques.  Timeout was called at which point the patient's name, procedure and operative site was confirmed by the operative team.    Attention was then turned to the upper abdomen. Initially, an incision was made at the umbilicus and the Veress needle introduced through this stab incision. The abdomen was insufflated with an opening pressure of 1 mmHg. The Veress needle was removed. The initial midline 5 mm port was inserted without difficulties and initial survey revealed no damage to underlying structures.  The left lateral 12 mm and 5 mm and right lateral 5 mm ports were then placed all under direct visualization without any injury noted to underlying  structures. At this point, the patient was placed into steep Trendelenburg. The pelvis was inspected as well as the upper abdomen with findings as noted above. Pelvic washings were obtained and sent for cytology. Bowels were packed up into the upper abdomen with gentle traction.     We then visualized the bilateral ureters transperitoneally and the bilateral IP ligaments were then cauterized and divided with the LigaSure device.  The bilateral utero-ovarian ligaments were then cauterized and divided and the right and left ovaries and fallopian tubes were placed into an EndoCatch bag, removed from the abdomen and sent for pathology on the right ovary which did return as at least borderline tumor of the ovary.  Given this we proceeded with the staging procedure with the exception of no lymph node dissection at the patients request. Given this we then needed to place a vaginal manipulator.  The instruments for the vaginal manipulator were positioned, a speculum was placed in the vagina. The anterior lip of the cervix was grasped, the uterus sounded to 7 cm and cervix was serially dilated. The Eco-Vacay vaginal manipulator was then inserted and the vaginal balloon was then inserted to obtain intraabdominal pressure. Attention was then turned to the pelvis. The round ligaments were identified bilaterally. They were divided using cautery and the retroperitoneal space was entered by dissecting the peritoneum lateral and cephalad to the IP ligaments. The ureters were identified. The rest of the peritoneum was skeletonized down to the level of the uterine arteries which were then cauterized and divided.  The bladder flap was created using cautery down to just below the level of the uterine manipulator cup. The rest of the parametrial tissue was dissected off of the uterus serially cauterized and divided sharply. A colpotomy was made on the anterior side and carried around to the posterior side of the uterine manipulator cup  using the electrocautery. The uterus was removed through the vagina and sent for pathology.  Attention was then turned to the infracolic omentectomy. The omentum was brought down into the pelvis, identifying the transverse colon. The LigaSure device was used to coagulate and transect the omentum infracolically and this was removed through the vagina and sent for pathology. Adequate hemostasis was achieved.  The staging biopsies and diaphragm cytology were then obtained in the usual manner and sent for pathology.    The vaginal cuff was then closed using a v lock sutures and the EndoStitch device with excellent reapproximation and good hemostasis.   Next, we performed cystoscopy using 30-degree angled scope and noted normal bladder mucosa, no evidence of foreign body and brisk efflux from the bilateral ureteral orifices. At this point, the vaginal balloon was removed from the vagina. We closed the fascia on the 12 mm incision using the Arslan-Lindsay device. All laparoscopic instruments and ports were now removed and CO2 allowed to escape from the abdomen. Skin was reapproximated with 4-0 Vicryl sutures and Dermabond applied. The patient tolerated the procedure well and was taken to the recovery room in stable condition.    Sponge, lap and needle counts were reported as correct x2 and instrument count was correct x1.     Ashley Knutson MD  Gynecologic Oncology  [facility]

## 2017-06-28 NOTE — PROGRESS NOTES
"Gynecologic Oncology Post-Op Check  6/28/2017      Subjective:   Pt feels well. Had trouble with nausea immediately post-op, but this has improved. Pain is under good control. Pt had voided x3 since surgery. No chest pain, shortness of breath, or heavy vaginal bleeding.     Objective:  /73  Pulse 67  Temp 97.7  F (36.5  C) (Oral)  Resp 16  Ht 1.727 m (5' 7.99\")  Wt 93.2 kg (205 lb 7.5 oz)  SpO2 97%  BMI 31.25 kg/m2      General: comfortable, no acute distress  CV: RRR, no murmurs  Respiratory: CTAB, appropriate work of breathing  Abd: soft, appropriately tender.  Incision with bandage in place. Small amount of oozing from umbilicus.   Ext: warm and well perfused, no edema.    Hemoglobin   Date Value Ref Range Status   06/23/2017 15.2 11.7 - 15.7 g/dL Final       Assessment & Plan:  Jane Amaya is a 67 year old female POD #0 s/p total laparoscopic hysterectomy, bilateral salpingo-oopherectomy, pelvic washings, biopsies, cystoscopy.    Dz: Borderline/ovarian cancer on frozen  FEN: Regular diet  Pain: doing well on Oxycodone, tylenol, ibuprofen  Heme: Hgb 15.2,  ml  CV: NI  Respiratory: NI  GI: GERD, given Zofran for nausea. Continue home PPI.   Endocrine: NI  : Voiding spontaneously.  ID: NI  PPX: Ambulatory    Dispo: Discharge to home.     Melvi Russo MD  OBGYN PGY-1  06/28/17 3:27 PM   Pager: [phone]    "

## 2017-06-28 NOTE — ANESTHESIA POSTPROCEDURE EVALUATION
Patient: Jane Amaya    Procedure(s):  Laparoscopic Total Hysterectomy, Removal Of Uterus, Cervix, Both Ovaries And Fallopian Tubes, Omentectomy, Cancer Staging Biopsies,Cystoscopy - Wound Class: II-Clean Contaminated   - Wound Class: II-Clean Contaminated    Diagnosis:Pelvic Mass   Diagnosis Additional Information: No value filed.    Anesthesia Type:  General, ETT    Note:  Anesthesia Post Evaluation    Patient location during evaluation: PACU  Patient participation: Able to fully participate in evaluation  Level of consciousness: awake  Pain management: adequate  Airway patency: patent  Cardiovascular status: acceptable  Respiratory status: acceptable  Hydration status: acceptable  PONV: controlled     Anesthetic complications: None          Last vitals:  Vitals:    06/28/17 1030 06/28/17 1045 06/28/17 1100   BP: 128/73 133/78 128/78   Pulse:      Resp: 20 20 20   Temp:      SpO2: 99% 99% 99%         Electronically Signed By: Smitha Trujillo MD  June 28, 2017  11:36 AM

## 2017-06-28 NOTE — IP AVS SNAPSHOT
Same Day Surgery [address] [address]    Phone:  [phone]                                       After Visit Summary   6/28/2017    Jane Amaya    MRN: [id number]           After Visit Summary Signature Page     I have received my discharge instructions, and my questions have been answered. I have discussed any challenges I see with this plan with the nurse or doctor.    ..........................................................................................................................................  Patient/Patient Representative Signature      ..........................................................................................................................................  Patient Representative Print Name and Relationship to Patient    ..................................................               ................................................  Date                                            Time    ..........................................................................................................................................  Reviewed by Signature/Title    ...................................................              ..............................................  Date                                                            Time

## 2017-06-28 NOTE — ANESTHESIA PREPROCEDURE EVALUATION
Anesthesia Evaluation     . Pt has had prior anesthetic. Type: General    History of anesthetic complications   - PONV        ROS/MED HX    ENT/Pulmonary:       Neurologic: Comment: Neuropathy in hands     (-) seizures and CVA   Cardiovascular: Comment: EKG from this AM read out as atrial fibrillation, on evaluation appears to be normal sinus rhythm with occasional PACs which is the same as a previous EKG done several days ago in clinic.  Patient denies any history of afib, thyroid problems, ischemic heart disease - neg cardiovascular ROS       METS/Exercise Tolerance:  >4 METS   Hematologic:         Musculoskeletal:         GI/Hepatic:     (+) GERD Asymptomatic on medication,       Renal/Genitourinary:  - ROS Renal section negative    (-) renal disease   Endo:         Psychiatric:         Infectious Disease:         Malignancy:   (+)   Follicular lymphoma        Other:                     Physical Exam  Normal systems: dental    Airway   Mallampati: II  TM distance: >3 FB  Neck ROM: full    Dental     Cardiovascular   Rhythm and rate: regular and normal      Pulmonary    breath sounds clear to auscultation                    Anesthesia Plan      History & Physical Review  History and physical reviewed and following examination; no interval change.    ASA Status:  2 .    NPO Status:  > 8 hours    Plan for General and ETT with Intravenous induction. Maintenance will be TIVA.    PONV prophylaxis:  Ondansetron (or other 5HT-3), Dexamethasone or Solumedrol and Scopolamine patch  Additional equipment: 2nd IV TIVA for PONV history      Postoperative Care  Postoperative pain management:  IV analgesics.      Consents  Anesthetic plan, risks, benefits and alternatives discussed with:  Patient..        ANESTHESIA PREOP EVALUATION    HPI: Jane Amaya is a 67 year old female who presents for Procedure(s):  Laparoscopic Removal Of Uterus, Cervix, Both Ovaries And Fallopian Tubes, Possible Cancer Surgery, Possible Open,  Cystoscopy - Wound Class: II-Clean Contaminated   - Wound Class: II-Clean Contaminated   - Wound Class: II-Clean Contaminated    PMHx/PSHx/ROS:  Past Medical History:   Diagnosis Date     Follicular lymphoma (H)      GERD (gastroesophageal reflux disease)      Other chronic pain     LBP 32 years, 2 bulging discs     PONV (postoperative nausea and vomiting)     [facility]. Extended N/V       Past Surgical History:   Procedure Laterality Date     APPENDECTOMY       BIOPSY      lung nodule biopsy     BIOPSY      ovarian bx     CHOLECYSTECTOMY         Past Anes Hx: No personal or family h/o anesthesia problems    Soc Hx:   Social History   Substance Use Topics     Smoking status: Former Smoker     Quit date: 1977     Smokeless tobacco: Not on file     Alcohol use No       Allergies:   Allergies   Allergen Reactions     Codeine Nausea and Vomiting     Ciprofloxacin        Meds:   Current Facility-Administered Medications   Medication     lactated ringers infusion     ceFAZolin (ANCEF) 1 g vial to attach to  ml bag for ADULT or 50 ml bag for PEDS     ceFAZolin sodium-dextrose (ANCEF) infusion 2 g     phenazopyridine (PYRIDIUM) tablet 200 mg     heparin sodium PF injection 5,000 Units       NPO Status: >8 hours     Labs:    BMP:  Recent Labs   Lab Test  06/23/17   1338   NA  143   POTASSIUM  4.3   CHLORIDE  108   CO2  30   BUN  13   CR  0.73   GLC  101*   BRUCE  8.7     CBC:   Recent Labs   Lab Test  06/23/17   1338   WBC  6.1   RBC  4.96   HGB  15.2   HCT  44.3   MCV  89   MCH  30.6   MCHC  34.3   RDW  12.9   PLT  229     Coags:  No results for input(s): INR, PTT, FIBR in the last 53911 hours.    Smitha Trujillo MD  Staff Anesthesiologist  Pager [phone]  6/28/2017  6:49 AM                          .

## 2017-06-29 ENCOUNTER — DOCUMENTATION ONLY (OUTPATIENT)
Dept: PHARMACY | Facility: CLINIC | Age: 67
End: 2017-06-29

## 2017-06-29 LAB
COPATH REPORT: NORMAL
COPATH REPORT: NORMAL

## 2017-06-30 NOTE — PROGRESS NOTES
Prior Authorization Approval    Ondansetron 4 mg  Qty: 20  Day Supply: 6  Diagnosis: S/P hysterectomy with oophorectomy (Z90.710 , Z90.721)    Expected copay: 3.30  Effective Dates: 3/31/2017 - 6/29/2018    Insurance: [employer]REE MONTALVO  Phone: [phone]  ID: [id number]  Case Number: [id number]  Submitted via: dori Burnette  Pharmacy Liaison  Cell: [phone] Page: [phone]

## 2017-07-03 ENCOUNTER — CARE COORDINATION (OUTPATIENT)
Dept: ONCOLOGY | Facility: CLINIC | Age: 67
End: 2017-07-03

## 2017-07-04 NOTE — PROGRESS NOTES
Consult Notes on Referred Patient        Dr. Nicanor Palmer MD  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  She is accompanied today by her daughter and infant grandson.  She is doing well post-operatively.  Eating and drinking well.  Normal bowel and bladder function.  No vaginal bleeding.    Cancer Course:    The patient has a history of lymphoma and was recently in to see her PCP for work up of a UTI and she ordered a CT for follow up for her lymphoma as she has not had follow up in quite some time.  She is relatively asymptomatic from this mass that was discovered    05:  Excision of intra-abdominal mass   Pathology:  Follicular lymphoma    17:  CT C/A/P:  Pulmonary nodule in RUL has increased in size now measuring 1.3 x 1.1 cm.  Stable subpleural nodule in the LLL.  Enlarging complex right ovarian cystic mass with mural nodularity and probable septation measuring 7.9 x 5.1 x 6.8 cm, previously 5.3 x 3.4 x 3.6 cm.  Partially calcified soft tissue lesion in left abdominal mesentery measuring 3.1 x 1.8 cm is stable along with other small lymphadenopathy which is consistent with treatment effect.    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    Review of Systems:  Systemic           no weight changes; no fever; no chills; no night sweats; no appetite changes; + fatigue  Skin           no rashes, or lesions  Eye           no irritation; no changes in vision  Rodrigue-Laryngeal           no dysphagia; no hoarseness   Pulmonary    no cough; no shortness of breath  Cardiovascular    no chest pain; no palpitations  Gastrointestinal    + diarrhea; + heartburn; no constipation; no abdominal pain; no changes in  bowel  habits; no blood in stool  Genitourinary   no urinary frequency; no urinary urgency; no dysuria; no pain; no abnormal vaginal discharge; no abnormal vaginal bleeding  Breast    no breast discharge; no breast changes; no breast pain  Musculoskeletal    no myalgias; no arthralgias; + back pain  Psychiatric           no depressed mood; no anxiety    Hematologic            no tender lymph nodes; no noticeable swellings or lumps   Endocrine    no hot flashes; no heat/cold intolerance         Neurological   no tremor; no numbness and tingling; no headaches; no difficulty sleeping    Obstetrics and Gynecology History:  ,   Menopause in 40's, denies PMB      Past Medical History:  Past Medical History:   Diagnosis Date     Follicular lymphoma (H)      GERD (gastroesophageal reflux disease)      Other chronic pain     LBP 32 years, 2 bulging discs     Ovarian tumor of borderline malignancy, right        Past Surgical History:  Past Surgical History:   Procedure Laterality Date     APPENDECTOMY       BIOPSY      lung nodule biopsy     BIOPSY      ovarian bx     CHOLECYSTECTOMY       CYSTOSCOPY N/A 2017    Procedure: CYSTOSCOPY;;  Surgeon: Ashley Emerson MD;  Location:  OR     LAPAROSCOPIC HYSTERECTOMY TOTAL, BILATERAL SALPINGO-OOPHORECTOMY, COMBINED N/A 2017    Procedure: COMBINED LAPAROSCOPIC HYSTERECTOMY TOTAL, SALPINGO-OOPHORECTOMY;  Laparoscopic Total Hysterectomy, Removal Of Uterus, Cervix, Both Ovaries And Fallopian Tubes, Omentectomy, Cancer Staging Biopsies,Cystoscopy;  Surgeon: Ashley Emerson MD;  Location:  OR       Health Maintenance:  Last Pap Smear:               Result: normal-No need for further pap smear exams  She has not had a history of abnormal Pap smears.    Last Mammogram: 6 years              Result: normal      She has not had a history of abnormal mammograms.    Last Colonoscopy: 11 years ago              Result: normal      Current  "Medications:   has a current medication list which includes the following prescription(s): ibuprofen, senna-docusate, ondansetron, and esomeprazole magnesium.     Allergies:   Codeine; Ciprofloxacin; and Phenergan [promethazine] and fentanyl      Social History:  Social History     Social History     Marital status:      Spouse name: N/A     Number of children: N/A     Years of education: N/A     Occupational History     Not on file.     Social History Main Topics     Smoking status: Former Smoker     Quit date: 1977     Smokeless tobacco: Not on file     Alcohol use No     Drug use: No     Sexual activity: Not on file     Other Topics Concern     Not on file     Social History Narrative     Lives with  and roommate, feels safe at home.  Retired.  Enjoys reading, gardening, crocheting, spending time with family.  Does not have an advanced directive on file and would like her daughterGeri to be her POA.  Would like full resuscitation if reversible cause is identified, however would not like to be kept on life sustaining measures long-term.      Family History:   The patient's family history is significant for.  Family History   Problem Relation Age of Onset     Breast Cancer Mother          Physical Exam:   /84  Pulse 56  Temp 97.8  F (36.6  C) (Oral)  Resp 20  Ht 1.727 m (5' 7.99\")  Wt 90.3 kg (199 lb)  SpO2 97%  BMI 30.26 kg/m2  Body mass index is 30.26 kg/(m^2).    General Appearance: healthy and alert, no distress      Gastrointestinal:       abdomen soft, non-tender, non-distended, no organomegaly or masses, port sites without erythema/induration or drainage    Genitourinary: External genitalia and urethral meatus appears normal.  Vagina is smooth without nodularity or masses with a well healing vaginal cuff.  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     A total of 15 " minutes was spent with the patient, >50% of which were spent in counseling the patient and/or treatment planning.      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  Pathology was reviewed and she was provided with a copy of her pathology results.  We discussed that while low, there is a chance of recurrence and thus we recommend surveillance with visits every 6 months for the following 5 years (6/22).  As her  was not initially elevated we will not plan to follow this.  We will also not perform routine imaging but only if symptoms arise.  We reviewed the signs and symptoms of recurrence and she was asked to call if these should occur.  She will return in 6 months     2.) Genetic risk factors were assessed and the patient does not meet the qualifications for a referral.      3.) Labs and/or tests ordered include:  Mammogram, colonoscopy     4.) Health maintenance issues addressed today include pt is due for mammogram and colonoscopy, she will schedule        Thank you for allowing us to participate in the care of your patient.         Sincerely,    Ashley Knutson MD  Gynecologic Oncology  [facility]       NAN FOSTER

## 2017-07-07 ENCOUNTER — ONCOLOGY VISIT (OUTPATIENT)
Dept: ONCOLOGY | Facility: CLINIC | Age: 67
End: 2017-07-07
Payer: COMMERCIAL

## 2017-07-07 VITALS
HEART RATE: 56 BPM | BODY MASS INDEX: 30.16 KG/M2 | RESPIRATION RATE: 20 BRPM | DIASTOLIC BLOOD PRESSURE: 84 MMHG | HEIGHT: 68 IN | TEMPERATURE: 97.8 F | SYSTOLIC BLOOD PRESSURE: 135 MMHG | WEIGHT: 199 LBS | OXYGEN SATURATION: 97 %

## 2017-07-07 DIAGNOSIS — D39.11 OVARIAN TUMOR OF BORDERLINE MALIGNANCY, RIGHT: Primary | ICD-10-CM

## 2017-07-07 PROCEDURE — 99024 POSTOP FOLLOW-UP VISIT: CPT | Performed by: OBSTETRICS & GYNECOLOGY

## 2017-07-07 ASSESSMENT — PAIN SCALES - GENERAL: PAINLEVEL: MILD PAIN (2)

## 2017-07-07 NOTE — MR AVS SNAPSHOT
After Visit Summary   2017    Jane Amaya    MRN: [id number]           Patient Information     Date Of Birth          1950        Visit Information        Provider Department      2017 12:30 PM Ashley Emerson MD [facility]        Today's Diagnoses     Ovarian tumor of borderline malignancy, right    -  1      Care Instructions    Mammogram    Colonoscopy    Next visit with NP in 6 months          Follow-ups after your visit        Who to contact     If you have questions or need follow up information about today's clinic visit or your schedule please contact [facility] directly at [phone].  Normal or non-critical lab and imaging results will be communicated to you by MyChart, letter or phone within 4 business days after the clinic has received the results. If you do not hear from us within 7 days, please contact the clinic through [employer] or phone. If you have a critical or abnormal lab result, we will notify you by phone as soon as possible.  Submit refill requests through [employer] or call your pharmacy and they will forward the refill request to us. Please allow 3 business days for your refill to be completed.          Additional Information About Your Visit        MyChart Information     [employer] is an electronic gateway that provides easy, online access to your medical records. With [employer], you can request a clinic appointment, read your test results, renew a prescription or communicate with your care team.     To sign up for [employer] visit the website at [email or web address]   You will be asked to enter the access code listed below, as well as some personal information. Please follow the directions to create your username and password.     Your access code is: [email or web address]  Expires: 2017  5:01 PM     Your access code will  in 90 days. If you need help or a new code, please contact your [facility] or call [phone] for assistance.        Care EveryWhere ID     This is your Care EveryWhere ID. This could be used by other organizations to access your [facility] medical records  [id number]        Your Vitals Were     Pulse Temperature Respirations Height Pulse Oximetry BMI (Body Mass Index)    56 97.8  F (36.6  C) (Oral) 20 1.727 m (5' 7.99\") 97% 30.26 kg/m2       Blood Pressure from Last 3 Encounters:   07/07/17 135/84   06/28/17 130/76   06/23/17 140/76    Weight from Last 3 Encounters:   07/07/17 90.3 kg (199 lb)   06/28/17 93.2 kg (205 lb 7.5 oz)   06/23/17 92.5 kg (204 lb)              Today, you had the following     No orders found for display         Today's Medication Changes          These changes are accurate as of: 7/7/17  1:25 PM.  If you have any questions, ask your nurse or doctor.               Stop taking these medicines if you haven't already. Please contact your care team if you have questions.     oxyCODONE-acetaminophen 5-325 MG per tablet   Commonly known as:  PERCOCET   Stopped by:  Ashley Emerson MD                    Primary Care Provider Office Phone # Fax #    Nicanor MCLAIN MD [place] [phone]       [address] [address]        Equal Access to Services     LAURI FITZGERALD AH: Hadii aad ku hadasho Soomaali, waaxda luqadaha, qaybta kaalmada adenikeegyada, sherice rose. So [facility] [phone].    ATENCIÓN: Si habla español, tiene a khanna disposición servicios gratuitos de asistencia lingüística. Llame al [phone].    We comply with applicable federal civil rights laws and [place] laws. We do not discriminate on the basis of race, color, national origin, age, disability sex, sexual orientation or gender identity.            Thank you!     Thank you for choosing [facility]  for your care. Our goal is always to provide you with excellent care. Hearing back from our patients is " one way we can continue to improve our services. Please take a few minutes to complete the written survey that you may receive in the mail after your visit with us. Thank you!             Your Updated Medication List - Protect others around you: Learn how to safely use, store and throw away your medicines at www.disposemymeds.org.          This list is accurate as of: 7/7/17  1:25 PM.  Always use your most recent med list.                   Brand Name Dispense Instructions for use Diagnosis    ibuprofen 600 MG tablet    ADVIL/MOTRIN    40 tablet    Take 1 tablet (600 mg) by mouth every 6 hours as needed for pain (mild)    S/P hysterectomy with oophorectomy       NEXIUM PO           ondansetron 4 MG tablet    ZOFRAN    20 tablet    Take 1 tablet (4 mg) by mouth every 8 hours as needed for nausea    S/P hysterectomy with oophorectomy       senna-docusate 8.6-50 MG per tablet    SENOKOT-S;PERICOLACE    30 tablet    Take 1-2 tablets by mouth 2 times daily Take while on oral narcotics to prevent or treat constipation.    S/P hysterectomy with oophorectomy

## 2017-07-07 NOTE — NURSING NOTE
"Oncology Rooming Note    July 7, 2017 12:32 PM   Jane Amaya is a 67 year old female who presents for:    Chief Complaint   Patient presents with     Oncology Clinic Visit     POST OP, surgery 6/28     Initial Vitals: There were no vitals taken for this visit. Estimated body mass index is 31.25 kg/(m^2) as calculated from the following:    Height as of 6/28/17: 1.727 m (5' 7.99\").    Weight as of 6/28/17: 93.2 kg (205 lb 7.5 oz). There is no height or weight on file to calculate BSA.  Data Unavailable Comment: Data Unavailable   No LMP recorded. Patient is postmenopausal.  Allergies reviewed: Yes  Medications reviewed: Yes    Medications: Medication refills not needed today.  Pharmacy name entered into EPIC: [facility] [address] [place] [address]    Clinical concerns:     8 minutes for nursing intake (face to face time)     JERED VERA LPN              "

## 2017-07-10 ENCOUNTER — TELEPHONE (OUTPATIENT)
Dept: ONCOLOGY | Facility: CLINIC | Age: 67
End: 2017-07-10

## 2017-07-10 NOTE — TELEPHONE ENCOUNTER
Per Dr Knutson, patient can schedule her Mammo, Colonoscopy & pelvic closer to home.     She may see any gynecologist every 6 months for the next 5 years. (Dr Knutson).

## 2017-08-03 ENCOUNTER — CARE COORDINATION (OUTPATIENT)
Dept: ONCOLOGY | Facility: CLINIC | Age: 67
End: 2017-08-03

## 2017-08-03 NOTE — PROGRESS NOTES
Received form from [employer] requesting information for Medicare Part D, B versus D Coverage Determination.  This is in regards to a Zofran prescription that patient received on 6/28/17 following her surgery with Dr. Knutson.  Form completed, and returned to [employer] via fax at [phone].  Form labeled for EMR scanning.    Joby Washburn RN, BSN, OCN

## 2018-02-20 ENCOUNTER — TELEPHONE (OUTPATIENT)
Dept: ONCOLOGY | Facility: CLINIC | Age: 68
End: 2018-02-20

## 2018-02-20 NOTE — TELEPHONE ENCOUNTER
A borderline tumor is not a true cancer.  There is a very small chance of recurrence.  Could you please call and let her know there is not cancer!  If she has further questions, I can call her

## 2018-02-20 NOTE — TELEPHONE ENCOUNTER
"Verified signed permission to speak to dtr,Geri. Geri calling with question regarding cancer diagnosis. States PCP had informed patient she was diagnosed with cancer per her pathology report 6/28/17.  Geri said neither she nor patient remember this being told to them at her post op appointment with Dr Knutson. Reviewed pathology report and Dr Knutson's notes with dtr. 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 Discussed per Dr Knutson's note recommendations were that because recurrence is low,follow up would be exam every 6 months , which could be done by her local gynecologist. Geri expressed understanding and will provide explanation to patient. Encouraged to call with any further questions.  Chela Landa  RN, BSN, OCN    Called Geri back to provide information from Dr Knutson. \" borderline tumor is not a true cancer.  There is a very small chance of recurrence.  Could you please call and let her know there is not cancer!\" PCP had instructed patient she needs to f/u q 6 months because of cancer. Reviewed it is true she should f/u q 6 months x 5 yrs but she does not have cancer.  Geri expressed gratitude for call and will let her mother know.   Chela Landa  RN, BSN, OCN    "

## 2022-06-10 NOTE — PATIENT INSTRUCTIONS
You have been scheduled for surgery on: 6/28/17    Diagnosis:  Pelvic mass    The surgical procedure is: Laparoscopic removal of both ovaries and fallopian tubes, possible removal of uterus, possible cancer surgery, possible open    Anticipated length of surgery: 1-3 hrs.  You will be in the recovery room for approximately 2-3 hrs after surgery.  Your family will not be able to see you until after you leave the recovery room.    Length of hospital stay:  This is an outpatient, extended stay surgery.  You will be discharged same day if you meet criteria for discharge.  If you have a larger surgical incision, you will need to be in the hospital 2-3 days.  ______________________________________________________________________    Preparation for Surgery:    To Schedule   1.  Labs:  CBC, CMP, T/S, , orders placed, please perform today   2.  EKG:  Order placed, please perform today   3.  Post-operative exam with me 1-2 weeks following surgery      Postoperative Restrictions:  No heavy lifting >20lbs for eight weeks, nothing in the vagina (no tampons, no intercourse, no douching) for eight weeks.     Postoperative visit:  Return to clinic 1-2 weeks after surgery for post operative visit.      Please contact the clinic with any questions or concerns.    Ashley Knutson MD  Gynecologic Oncology  [facility]        no chest pain, no cough, and no shortness of breath.

## 2024-10-07 NOTE — OR NURSING
Called and let Shiloh know that Dr. Guaman is usually happy to do two sites in one procedure day, but he will review this at the phone consult and let Alannah/Shiloh know if they need an additional appt.     Jennifer Esquivel, Complex  10/7/2024 3:52 PM     Dr. Zenia Hernandez at bedside speaking with patient. Pt pain tolerable/controlled. Pt void x3. Okay to be d/c home per service.

## (undated) DEVICE — RETR ELEV / UTERINE MANIPULATOR V-CARE MED CUP 60-6085-201A

## (undated) DEVICE — NDL INSUFFLATION 120MM VERRES 172015

## (undated) DEVICE — ENDO POUCH 5X9" 15MM ENDOCATCH II 173049

## (undated) DEVICE — DEVICE ENDO STITCH APPLIER 10MM 173016

## (undated) DEVICE — LINEN TOWEL PACK X30 5481

## (undated) DEVICE — KOH COLPOTOMIZER OCCLUDER  CPO-6

## (undated) DEVICE — LINEN TOWEL PACK X6 WHITE 5487

## (undated) DEVICE — ESU LIGASURE LAPAROSCOPIC BLUNT TIP SEALER 5MMX37CM LF1637

## (undated) DEVICE — ENDO TROCAR 12MM VERSAONE BLADELESS W/STD FIX CAN NONB12STF

## (undated) DEVICE — SU VICRYL 0 TIE 54" J608H

## (undated) DEVICE — SOL NACL 0.9% INJ 1000ML BAG 07983-09

## (undated) DEVICE — SU DERMABOND ADVANCED .7ML DNX12

## (undated) DEVICE — SYR 10ML LL W/O NDL

## (undated) DEVICE — SUCTION IRR STRYKERFLOW II W/TIP 250-070-520

## (undated) DEVICE — ENDO TROCAR 05MM VERSAONE BLADELESS W/STD FIX CAN NONB5STF

## (undated) DEVICE — SU VICRYL 4-0 PS-2 18" UND J496H

## (undated) DEVICE — PREP SCRUB CARE CHLOROXYLENOL (PCMX) 4OZ 29902-004

## (undated) DEVICE — SPECIMEN TRAP MUCOUS 40ML LUKI C30200A

## (undated) DEVICE — CATH TRAY FOLEY 16FR W/URINE METER STATLOCK 303316A

## (undated) DEVICE — TUBING IRRIG CYSTO/BLADDER SET 81" LF 2C4040

## (undated) DEVICE — ENDO POUCH GOLD 10MM ECATCH 173050G

## (undated) DEVICE — NDL BLUNT 18GA 1" W/O FILTER 305181

## (undated) DEVICE — PREP POVIDONE IODINE SOLUTION 10% 120ML

## (undated) DEVICE — WIPES FOLEY CARE SURESTEP PROVON DFC100

## (undated) DEVICE — PREP CHLORAPREP 26ML TINTED ORANGE  260815

## (undated) DEVICE — WIPE PREMOIST CLEANSING WASHCLOTHS 7988

## (undated) DEVICE — SOL WATER IRRIG 1000ML BOTTLE 2F7114

## (undated) DEVICE — SU WND CLOSURE RELOAD VLOC 180 ABS 0 GREEN 8" VLOCA008L

## (undated) DEVICE — SUCTION MANIFOLD DORNOCH ULTRA CART UL-CL500

## (undated) DEVICE — Device

## (undated) DEVICE — ENDO CANNULA 05MM VERSAONE UNIVERSAL UNVCA5STF

## (undated) DEVICE — DEVICE SUTURE GRASPER TROCAR CLOSURE 14GA PMITCSG

## (undated) DEVICE — ENDO SHEARS 5MM 176643

## (undated) DEVICE — ESU GROUND PAD ADULT W/CORD E7507

## (undated) RX ORDER — ROCURONIUM BROMIDE 50 MG/5 ML
SYRINGE (ML) INTRAVENOUS
Status: DISPENSED
Start: 2017-06-28

## (undated) RX ORDER — PHENAZOPYRIDINE HYDROCHLORIDE 200 MG/1
TABLET, FILM COATED ORAL
Status: DISPENSED
Start: 2017-06-28

## (undated) RX ORDER — IBUPROFEN 600 MG/1
TABLET, FILM COATED ORAL
Status: DISPENSED
Start: 2017-06-28

## (undated) RX ORDER — ONDANSETRON 2 MG/ML
INJECTION INTRAMUSCULAR; INTRAVENOUS
Status: DISPENSED
Start: 2017-06-28

## (undated) RX ORDER — FENTANYL CITRATE 50 UG/ML
INJECTION, SOLUTION INTRAMUSCULAR; INTRAVENOUS
Status: DISPENSED
Start: 2017-06-28

## (undated) RX ORDER — HYDRALAZINE HYDROCHLORIDE 20 MG/ML
INJECTION INTRAMUSCULAR; INTRAVENOUS
Status: DISPENSED
Start: 2017-06-28

## (undated) RX ORDER — OXYCODONE AND ACETAMINOPHEN 5; 325 MG/1; MG/1
TABLET ORAL
Status: DISPENSED
Start: 2017-06-28

## (undated) RX ORDER — HEPARIN SODIUM 5000 [USP'U]/.5ML
INJECTION, SOLUTION INTRAVENOUS; SUBCUTANEOUS
Status: DISPENSED
Start: 2017-06-28

## (undated) RX ORDER — PROPOFOL 10 MG/ML
INJECTION, EMULSION INTRAVENOUS
Status: DISPENSED
Start: 2017-06-28

## (undated) RX ORDER — CEFAZOLIN SODIUM 2 G/100ML
INJECTION, SOLUTION INTRAVENOUS
Status: DISPENSED
Start: 2017-06-28

## (undated) RX ORDER — SCOLOPAMINE TRANSDERMAL SYSTEM 1 MG/1
PATCH, EXTENDED RELEASE TRANSDERMAL
Status: DISPENSED
Start: 2017-06-28

## (undated) RX ORDER — DEXAMETHASONE SODIUM PHOSPHATE 4 MG/ML
INJECTION, SOLUTION INTRA-ARTICULAR; INTRALESIONAL; INTRAMUSCULAR; INTRAVENOUS; SOFT TISSUE
Status: DISPENSED
Start: 2017-06-28